# Patient Record
Sex: FEMALE | Race: WHITE | HISPANIC OR LATINO | URBAN - METROPOLITAN AREA
[De-identification: names, ages, dates, MRNs, and addresses within clinical notes are randomized per-mention and may not be internally consistent; named-entity substitution may affect disease eponyms.]

---

## 2024-10-05 ENCOUNTER — HOSPITAL ENCOUNTER (EMERGENCY)
Facility: HOSPITAL | Age: 34
Discharge: HOME | End: 2024-10-05
Attending: EMERGENCY MEDICINE
Payer: COMMERCIAL

## 2024-10-05 ENCOUNTER — APPOINTMENT (OUTPATIENT)
Dept: RADIOLOGY | Facility: HOSPITAL | Age: 34
End: 2024-10-05
Payer: COMMERCIAL

## 2024-10-05 VITALS
WEIGHT: 285 LBS | RESPIRATION RATE: 16 BRPM | DIASTOLIC BLOOD PRESSURE: 70 MMHG | HEIGHT: 67 IN | TEMPERATURE: 97.5 F | SYSTOLIC BLOOD PRESSURE: 132 MMHG | HEART RATE: 72 BPM | OXYGEN SATURATION: 98 % | BODY MASS INDEX: 44.73 KG/M2

## 2024-10-05 DIAGNOSIS — S92.354A CLOSED NONDISPLACED FRACTURE OF FIFTH METATARSAL BONE OF RIGHT FOOT, INITIAL ENCOUNTER: Primary | ICD-10-CM

## 2024-10-05 PROCEDURE — 2500000001 HC RX 250 WO HCPCS SELF ADMINISTERED DRUGS (ALT 637 FOR MEDICARE OP): Performed by: EMERGENCY MEDICINE

## 2024-10-05 PROCEDURE — 99283 EMERGENCY DEPT VISIT LOW MDM: CPT

## 2024-10-05 PROCEDURE — 73630 X-RAY EXAM OF FOOT: CPT | Mod: RT

## 2024-10-05 PROCEDURE — 73630 X-RAY EXAM OF FOOT: CPT | Mod: RIGHT SIDE | Performed by: RADIOLOGY

## 2024-10-05 RX ORDER — SERTRALINE HYDROCHLORIDE 100 MG/1
TABLET, FILM COATED ORAL
COMMUNITY
Start: 2023-06-02

## 2024-10-05 RX ORDER — BUPROPION HYDROCHLORIDE 100 MG/1
TABLET ORAL
COMMUNITY
Start: 2023-05-15

## 2024-10-05 RX ORDER — PROPRANOLOL HYDROCHLORIDE 10 MG/1
TABLET ORAL
COMMUNITY
Start: 2023-05-21

## 2024-10-05 RX ORDER — NAPROXEN 500 MG/1
500 TABLET ORAL EVERY 12 HOURS PRN
Qty: 20 TABLET | Refills: 0 | Status: SHIPPED | OUTPATIENT
Start: 2024-10-05

## 2024-10-05 RX ORDER — NORGESTIMATE AND ETHINYL ESTRADIOL 7DAYSX3 28
1 KIT ORAL DAILY
COMMUNITY
Start: 2024-05-17

## 2024-10-05 RX ORDER — IBUPROFEN 600 MG/1
600 TABLET ORAL ONCE
Status: COMPLETED | OUTPATIENT
Start: 2024-10-05 | End: 2024-10-05

## 2024-10-05 RX ADMIN — IBUPROFEN 600 MG: 600 TABLET, FILM COATED ORAL at 18:36

## 2024-10-05 ASSESSMENT — LIFESTYLE VARIABLES
EVER FELT BAD OR GUILTY ABOUT YOUR DRINKING: NO
HAVE YOU EVER FELT YOU SHOULD CUT DOWN ON YOUR DRINKING: NO
TOTAL SCORE: 0
EVER HAD A DRINK FIRST THING IN THE MORNING TO STEADY YOUR NERVES TO GET RID OF A HANGOVER: NO
HAVE PEOPLE ANNOYED YOU BY CRITICIZING YOUR DRINKING: NO

## 2024-10-05 ASSESSMENT — COLUMBIA-SUICIDE SEVERITY RATING SCALE - C-SSRS
6. HAVE YOU EVER DONE ANYTHING, STARTED TO DO ANYTHING, OR PREPARED TO DO ANYTHING TO END YOUR LIFE?: NO
1. IN THE PAST MONTH, HAVE YOU WISHED YOU WERE DEAD OR WISHED YOU COULD GO TO SLEEP AND NOT WAKE UP?: NO
2. HAVE YOU ACTUALLY HAD ANY THOUGHTS OF KILLING YOURSELF?: NO

## 2024-10-05 ASSESSMENT — PAIN SCALES - GENERAL: PAINLEVEL_OUTOF10: 2

## 2024-10-05 ASSESSMENT — PAIN DESCRIPTION - ORIENTATION: ORIENTATION: RIGHT

## 2024-10-05 ASSESSMENT — PAIN - FUNCTIONAL ASSESSMENT: PAIN_FUNCTIONAL_ASSESSMENT: 0-10

## 2024-10-05 ASSESSMENT — PAIN DESCRIPTION - LOCATION: LOCATION: FOOT

## 2024-10-05 ASSESSMENT — PAIN DESCRIPTION - PAIN TYPE: TYPE: ACUTE PAIN

## 2024-10-05 NOTE — DISCHARGE INSTRUCTIONS
Seek immediate medical attention if you develop: increasing pain, numbness, tingling, weakness, loss of motion in your foot or toes, your foot or toes becomes pale or cold, or you develop any new or worsening symptoms.    Use the crutches that you already have.  You are not to put any weight on your injured foot.  Use the brace that you were given here in the ER.

## 2024-10-06 NOTE — ED PROVIDER NOTES
HPI   Chief Complaint   Patient presents with    Foot Injury     Right. Injury at 10 PM last night. Pt reports heard crack on outside of foot       34-year-old female to emerged part with chief complaint of right foot pain.  She said she was walking through the lawn and she stepped in a divot and she rolled her ankle.  She felt a crack in the foot.  Pain is over the lateral foot over the region of the fifth metatarsal.  She hit her head.  No loss of conscious.  No vomiting.  No blood thinners.  No other injuries except for her foot.  She actually bought a postop shoe from [employer] and has been using this.  There is no other injuries or complaints.  She does have a mild headache which she chronically has.  This is not atypical for her.    She does have a history of hyperlipidemia.  She does have a history of ankle fracture.  She never had any foot or ankle surgeries.  She has no known drug allergies.  She does not smoke or do drugs.  She does drink occasionally.  She does not have a primary care doctor.      EXAM:  Vital signs reviewed  General:  Appears well  Alert  HEENT:  Head atraumatic  Eyes normal inspection  Normal ENT inspection  NECK:  Normal inspection  RESPIRATORY:  Normal breath sounds  No chest wall tenderness  No respiratory distress  CVS:  Heart rate and rhythm regular  No Murmurs  ABDOMEN/GI:  Soft  Non-tender  Bowel sounds normal  FEMALE GENITAL:  []  MALE GENITAL:  []  RECTAL:  []  BACK:  Normal inspection  EXTREMITIES:  Mild swelling over the lateral right foot.  Pedal pulses intact.  Cap refill brisk.  Sensation intact.  There is tenderness over the fifth metatarsal.  Full range of motion of the toes.  No tenderness in the ankle.  NEURO:  Alert and oriented X 3  CN's normal as tested  Sensation normal  Motor normal  PSYCH:  Mood normal  Affect normal  SKIN:  Color normal  No rash  Warm  Dry              Patient History   Past Medical History:   Diagnosis Date    Anxiety     Depression     High  cholesterol      Past Surgical History:   Procedure Laterality Date    LITHOTRIPSY       No family history on file.  Social History     Tobacco Use    Smoking status: Never    Smokeless tobacco: Never   Substance Use Topics    Alcohol use: Never    Drug use: Never       Physical Exam   ED Triage Vitals [10/05/24 1756]   Temperature Heart Rate Respirations BP   36.4 °C (97.5 °F) 75 16 137/61      Pulse Ox Temp Source Heart Rate Source Patient Position   99 % Temporal Monitor Sitting      BP Location FiO2 (%)     Right arm --       Physical Exam      ED Course & MDM   Diagnoses as of 10/05/24 2341   Closed nondisplaced fracture of fifth metatarsal bone of right foot, initial encounter                 No data recorded     Xin Coma Scale Score: 15 (10/05/24 1758 : Kianna Jolley RN)                           Medical Decision Making  X-ray shows a nondisplaced fracture through the proximal fifth metatarsal diaphysis.    Patient is placed in a walking boot.  She already has crutches.  She is to be nonweightbearing.  She is to follow-up with orthopedics.  She is given prescription for naproxen.  She is also to follow-up with primary care.  She is to return to the nearest ER for any new or worsening symptoms.  She is satisfied this plan.        Procedure  Procedures     Sai Arnold, DO  10/05/24 4421